# Patient Record
Sex: FEMALE | URBAN - METROPOLITAN AREA
[De-identification: names, ages, dates, MRNs, and addresses within clinical notes are randomized per-mention and may not be internally consistent; named-entity substitution may affect disease eponyms.]

---

## 2024-06-27 ENCOUNTER — OFFICE VISIT (OUTPATIENT)
Dept: URBAN - METROPOLITAN AREA CLINIC 111 | Facility: CLINIC | Age: 74
End: 2024-06-27

## 2024-06-27 ENCOUNTER — DASHBOARD ENCOUNTERS (OUTPATIENT)
Age: 74
End: 2024-06-27

## 2024-06-27 ENCOUNTER — LAB OUTSIDE AN ENCOUNTER (OUTPATIENT)
Dept: URBAN - METROPOLITAN AREA CLINIC 111 | Facility: CLINIC | Age: 74
End: 2024-06-27

## 2024-06-27 VITALS
TEMPERATURE: 98.1 F | DIASTOLIC BLOOD PRESSURE: 67 MMHG | WEIGHT: 113 LBS | SYSTOLIC BLOOD PRESSURE: 119 MMHG | HEART RATE: 86 BPM

## 2024-06-27 PROBLEM — 427816007: Status: ACTIVE | Noted: 2024-06-27

## 2024-06-27 RX ORDER — TRAMADOL HYDROCHLORIDE 50 MG/1
1 TABLET AS NEEDED TABLET, FILM COATED ORAL
Qty: 30 | Refills: 1 | OUTPATIENT
Start: 2024-06-27 | End: 2024-07-11

## 2024-06-27 RX ORDER — AZITHROMYCIN MONOHYDRATE 500 MG/1
1 TABLET TABLET, FILM COATED ORAL
Status: ACTIVE | COMMUNITY

## 2024-06-27 RX ORDER — RIFAMPIN 600 MG/1
AS DIRECTED INJECTION, POWDER, LYOPHILIZED, FOR SOLUTION INTRAVENOUS
Status: ACTIVE | COMMUNITY

## 2024-06-27 RX ORDER — AZELASTINE 1 MG/ML
1 PUFF IN EACH NOSTRIL SPRAY, METERED NASAL TWICE A DAY
Status: ACTIVE | COMMUNITY

## 2024-06-27 RX ORDER — ATORVASTATIN CALCIUM 40 MG/1
1 TABLET TABLET, FILM COATED ORAL ONCE A DAY
Status: ACTIVE | COMMUNITY

## 2024-06-27 NOTE — HPI-TODAY'S VISIT:
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 Has lost weight since Tx with Rifampin, was given Megace. After first dose of megace had developed severe abdominal pain. Lower abdominal pain. Initially thought it was because of lactose that was in the megace.  She is gluten free, lactose free. She went to [facility] ER -- was discharged without resolution of her Sx or coming to a diagnosis. Was given dicyclomine without relief. Also tried Gas-X without relief. Was given Dilaudid, says it did not help wtih pain aside from making her numb, but made her loopy.

## 2024-06-28 ENCOUNTER — TELEPHONE ENCOUNTER (OUTPATIENT)
Dept: URBAN - METROPOLITAN AREA CLINIC 11 | Facility: CLINIC | Age: 74
End: 2024-06-28

## 2024-06-28 ENCOUNTER — TELEPHONE ENCOUNTER (OUTPATIENT)
Dept: URBAN - METROPOLITAN AREA CLINIC 23 | Facility: CLINIC | Age: 74
End: 2024-06-28

## 2024-06-28 LAB
A/G RATIO: 1.6
ALBUMIN: 3.8
ALKALINE PHOSPHATASE: 81
ALT (SGPT): 12
APPEARANCE: CLEAR
AST (SGOT): 20
BACTERIA: (no result)
BILIRUBIN, TOTAL: 0.7
BILIRUBIN: NEGATIVE
BUN/CREATININE RATIO: 8
BUN: 6
CALCIUM: 9.3
CARBON DIOXIDE, TOTAL: 24
CHLORIDE: 106
COLOR: (no result)
CREATININE: 0.73
EGFR: 88
GLOBULIN, TOTAL: 2.4
GLUCOSE: 81
GLUCOSE: NEGATIVE
HEMATOCRIT: 40.3
HEMOGLOBIN: 13.5
HYALINE CAST: (no result)
KETONES: NEGATIVE
LEUKOCYTE ESTERASE: NEGATIVE
MCH: 29
MCHC: 33.5
MCV: 86.5
MPV: 11.2
NITRITE: NEGATIVE
OCCULT BLOOD: NEGATIVE
PH: 7
PLATELET COUNT: 221
POTASSIUM: 4.7
PROTEIN, TOTAL: 6.2
PROTEIN: NEGATIVE
RBC: (no result)
RDW: 13.2
RED BLOOD CELL COUNT: 4.66
SODIUM: 141
SPECIFIC GRAVITY: 1
SQUAMOUS EPITHELIAL CELLS: (no result)
WBC: (no result)
WHITE BLOOD CELL COUNT: 3.9

## 2024-06-28 RX ORDER — ONDANSETRON HYDROCHLORIDE 4 MG/1
1 TABLET TABLET, FILM COATED ORAL ONCE A DAY
Qty: 30 | OUTPATIENT
Start: 2024-06-28

## 2024-06-30 ENCOUNTER — WEB ENCOUNTER (OUTPATIENT)
Dept: URBAN - METROPOLITAN AREA CLINIC 12 | Facility: CLINIC | Age: 74
End: 2024-06-30